# Patient Record
Sex: FEMALE | Race: WHITE | Employment: OTHER | URBAN - METROPOLITAN AREA
[De-identification: names, ages, dates, MRNs, and addresses within clinical notes are randomized per-mention and may not be internally consistent; named-entity substitution may affect disease eponyms.]

---

## 2018-07-30 ENCOUNTER — HOSPITAL ENCOUNTER (INPATIENT)
Age: 73
LOS: 2 days | Discharge: HOME | DRG: 444 | End: 2018-08-01
Attending: EMERGENCY MEDICINE | Admitting: INTERNAL MEDICINE
Payer: MEDICARE

## 2018-07-30 ENCOUNTER — APPOINTMENT (OUTPATIENT)
Dept: ULTRASOUND IMAGING | Age: 73
DRG: 444 | End: 2018-07-30
Payer: MEDICARE

## 2018-07-30 DIAGNOSIS — K80.00 CALCULUS OF GALLBLADDER WITH ACUTE CHOLECYSTITIS WITHOUT OBSTRUCTION: ICD-10-CM

## 2018-07-30 DIAGNOSIS — K85.90 ACUTE PANCREATITIS, UNSPECIFIED COMPLICATION STATUS, UNSPECIFIED PANCREATITIS TYPE: Primary | ICD-10-CM

## 2018-07-30 PROBLEM — K85.10 ACUTE GALLSTONE PANCREATITIS: Status: ACTIVE | Noted: 2018-07-30

## 2018-07-30 LAB
A/G RATIO: 1.4 (ref 1.1–2.2)
ALBUMIN SERPL-MCNC: 4.2 G/DL (ref 3.4–5)
ALP BLD-CCNC: 99 U/L (ref 40–129)
ALT SERPL-CCNC: 39 U/L (ref 10–40)
ANION GAP SERPL CALCULATED.3IONS-SCNC: 9 MMOL/L (ref 3–16)
AST SERPL-CCNC: 107 U/L (ref 15–37)
BASOPHILS ABSOLUTE: 0 K/UL (ref 0–0.2)
BASOPHILS RELATIVE PERCENT: 0.6 %
BILIRUB SERPL-MCNC: 0.8 MG/DL (ref 0–1)
BUN BLDV-MCNC: 17 MG/DL (ref 7–20)
CALCIUM SERPL-MCNC: 9.7 MG/DL (ref 8.3–10.6)
CHLORIDE BLD-SCNC: 102 MMOL/L (ref 99–110)
CO2: 26 MMOL/L (ref 21–32)
CREAT SERPL-MCNC: 0.8 MG/DL (ref 0.6–1.2)
EOSINOPHILS ABSOLUTE: 0.1 K/UL (ref 0–0.6)
EOSINOPHILS RELATIVE PERCENT: 1 %
GFR AFRICAN AMERICAN: >60
GFR NON-AFRICAN AMERICAN: >60
GLOBULIN: 2.9 G/DL
GLUCOSE BLD-MCNC: 125 MG/DL (ref 70–99)
HCT VFR BLD CALC: 41 % (ref 36–48)
HEMOGLOBIN: 13.7 G/DL (ref 12–16)
LIPASE: >600 U/L (ref 13–60)
LYMPHOCYTES ABSOLUTE: 1 K/UL (ref 1–5.1)
LYMPHOCYTES RELATIVE PERCENT: 12.6 %
MCH RBC QN AUTO: 30.3 PG (ref 26–34)
MCHC RBC AUTO-ENTMCNC: 33.5 G/DL (ref 31–36)
MCV RBC AUTO: 90.6 FL (ref 80–100)
MONOCYTES ABSOLUTE: 0.4 K/UL (ref 0–1.3)
MONOCYTES RELATIVE PERCENT: 5.5 %
NEUTROPHILS ABSOLUTE: 6.1 K/UL (ref 1.7–7.7)
NEUTROPHILS RELATIVE PERCENT: 80.3 %
PDW BLD-RTO: 14.7 % (ref 12.4–15.4)
PLATELET # BLD: 176 K/UL (ref 135–450)
PMV BLD AUTO: 8.1 FL (ref 5–10.5)
POTASSIUM SERPL-SCNC: 4.4 MMOL/L (ref 3.5–5.1)
RBC # BLD: 4.53 M/UL (ref 4–5.2)
SODIUM BLD-SCNC: 137 MMOL/L (ref 136–145)
TOTAL PROTEIN: 7.1 G/DL (ref 6.4–8.2)
WBC # BLD: 7.6 K/UL (ref 4–11)

## 2018-07-30 PROCEDURE — 6360000002 HC RX W HCPCS: Performed by: PHYSICIAN ASSISTANT

## 2018-07-30 PROCEDURE — S0028 INJECTION, FAMOTIDINE, 20 MG: HCPCS | Performed by: INTERNAL MEDICINE

## 2018-07-30 PROCEDURE — 83690 ASSAY OF LIPASE: CPT

## 2018-07-30 PROCEDURE — 96375 TX/PRO/DX INJ NEW DRUG ADDON: CPT

## 2018-07-30 PROCEDURE — 6360000002 HC RX W HCPCS: Performed by: INTERNAL MEDICINE

## 2018-07-30 PROCEDURE — 2500000003 HC RX 250 WO HCPCS: Performed by: INTERNAL MEDICINE

## 2018-07-30 PROCEDURE — 2580000003 HC RX 258: Performed by: PHYSICIAN ASSISTANT

## 2018-07-30 PROCEDURE — 76705 ECHO EXAM OF ABDOMEN: CPT

## 2018-07-30 PROCEDURE — 99285 EMERGENCY DEPT VISIT HI MDM: CPT

## 2018-07-30 PROCEDURE — 2580000003 HC RX 258: Performed by: INTERNAL MEDICINE

## 2018-07-30 PROCEDURE — 96374 THER/PROPH/DIAG INJ IV PUSH: CPT

## 2018-07-30 PROCEDURE — 96361 HYDRATE IV INFUSION ADD-ON: CPT

## 2018-07-30 PROCEDURE — 80053 COMPREHEN METABOLIC PANEL: CPT

## 2018-07-30 PROCEDURE — 85025 COMPLETE CBC W/AUTO DIFF WBC: CPT

## 2018-07-30 PROCEDURE — 1200000000 HC SEMI PRIVATE

## 2018-07-30 RX ORDER — MAGNESIUM SULFATE 1 G/100ML
1 INJECTION INTRAVENOUS PRN
Status: DISCONTINUED | OUTPATIENT
Start: 2018-07-30 | End: 2018-08-01 | Stop reason: HOSPADM

## 2018-07-30 RX ORDER — KETOROLAC TROMETHAMINE 30 MG/ML
15 INJECTION, SOLUTION INTRAMUSCULAR; INTRAVENOUS ONCE
Status: COMPLETED | OUTPATIENT
Start: 2018-07-30 | End: 2018-07-30

## 2018-07-30 RX ORDER — ONDANSETRON 2 MG/ML
4 INJECTION INTRAMUSCULAR; INTRAVENOUS EVERY 6 HOURS PRN
Status: DISCONTINUED | OUTPATIENT
Start: 2018-07-30 | End: 2018-07-30 | Stop reason: CLARIF

## 2018-07-30 RX ORDER — SODIUM CHLORIDE 0.9 % (FLUSH) 0.9 %
10 SYRINGE (ML) INJECTION PRN
Status: DISCONTINUED | OUTPATIENT
Start: 2018-07-30 | End: 2018-08-01 | Stop reason: HOSPADM

## 2018-07-30 RX ORDER — POTASSIUM CHLORIDE 7.45 MG/ML
10 INJECTION INTRAVENOUS PRN
Status: DISCONTINUED | OUTPATIENT
Start: 2018-07-30 | End: 2018-08-01 | Stop reason: HOSPADM

## 2018-07-30 RX ORDER — PRAVASTATIN SODIUM 10 MG
10 TABLET ORAL DAILY
COMMUNITY

## 2018-07-30 RX ORDER — MORPHINE SULFATE 1 MG/ML
2 INJECTION, SOLUTION EPIDURAL; INTRATHECAL; INTRAVENOUS
Status: DISCONTINUED | OUTPATIENT
Start: 2018-07-30 | End: 2018-08-01 | Stop reason: HOSPADM

## 2018-07-30 RX ORDER — SODIUM CHLORIDE 0.9 % (FLUSH) 0.9 %
10 SYRINGE (ML) INJECTION EVERY 12 HOURS SCHEDULED
Status: DISCONTINUED | OUTPATIENT
Start: 2018-07-30 | End: 2018-08-01 | Stop reason: HOSPADM

## 2018-07-30 RX ORDER — SODIUM CHLORIDE, SODIUM LACTATE, POTASSIUM CHLORIDE, CALCIUM CHLORIDE 600; 310; 30; 20 MG/100ML; MG/100ML; MG/100ML; MG/100ML
INJECTION, SOLUTION INTRAVENOUS CONTINUOUS
Status: DISCONTINUED | OUTPATIENT
Start: 2018-07-30 | End: 2018-08-01 | Stop reason: HOSPADM

## 2018-07-30 RX ORDER — 0.9 % SODIUM CHLORIDE 0.9 %
1000 INTRAVENOUS SOLUTION INTRAVENOUS ONCE
Status: COMPLETED | OUTPATIENT
Start: 2018-07-30 | End: 2018-07-30

## 2018-07-30 RX ADMIN — KETOROLAC TROMETHAMINE 15 MG: 30 INJECTION, SOLUTION INTRAMUSCULAR at 19:27

## 2018-07-30 RX ADMIN — SODIUM CHLORIDE, POTASSIUM CHLORIDE, SODIUM LACTATE AND CALCIUM CHLORIDE: 600; 310; 30; 20 INJECTION, SOLUTION INTRAVENOUS at 22:53

## 2018-07-30 RX ADMIN — SODIUM CHLORIDE 1000 ML: 9 INJECTION, SOLUTION INTRAVENOUS at 19:27

## 2018-07-30 RX ADMIN — Medication 2 MG: at 23:16

## 2018-07-30 RX ADMIN — Medication 10 ML: at 22:53

## 2018-07-30 RX ADMIN — FAMOTIDINE 20 MG: 10 INJECTION, SOLUTION INTRAVENOUS at 22:53

## 2018-07-30 RX ADMIN — PROCHLORPERAZINE EDISYLATE 10 MG: 5 INJECTION INTRAMUSCULAR; INTRAVENOUS at 19:27

## 2018-07-30 ASSESSMENT — PAIN DESCRIPTION - PAIN TYPE: TYPE: ACUTE PAIN

## 2018-07-30 ASSESSMENT — PAIN SCALES - GENERAL
PAINLEVEL_OUTOF10: 8

## 2018-07-30 ASSESSMENT — PAIN DESCRIPTION - LOCATION: LOCATION: ABDOMEN

## 2018-07-30 NOTE — ED PROVIDER NOTES
[address] ED  eMERGENCY dEPARTMENT eNCOUnter        Pt Name: Letty Austin  MRN: [id number]  [place] 1945  Date of evaluation: 7/30/2018  Provider: Jessica Paz PA-C  PCP: No primary care provider on file. ED Attending: Rufus Drummond MD    CHIEF COMPLAINT       Chief Complaint   Patient presents with    Abdominal Pain     pt c/o right sided abdominal pain that started a couple hours ago. HISTORY OF PRESENT ILLNESS   (Location/Symptom, Timing/Onset, Context/Setting, Quality, Duration, Modifying Factors, Severity)  Note limiting factors. Letty Austin is a 67 y.o. female presents to the emergency department with abdominal pain. The patient states that she will set down to eat dinner, had a few bites and started having abdominal pain that radiated to her back. She describes her pain as an 8 out of 10 sharp pain. She states that she was sweaty initially, but denies any chest pain or shortness of breath. Denies fever, chills, nausea, vomiting, diarrhea, hematochezia, urinary symptoms. Denies any previous abdominal surgery. Nursing Notes were all reviewed and agreed with or any disagreements were addressed  in the HPI. REVIEW OF SYSTEMS    (2-9 systems for level 4, 10 or more for level 5)     Review of Systems    Positives and Pertinent negatives as per HPI. Except as noted above in the ROS, all other systems were reviewed and negative. PAST MEDICAL HISTORY     Past Medical History:   Diagnosis Date    Hyperlipidemia          SURGICAL HISTORY       Past Surgical History:   Procedure Laterality Date    LYMPHADENECTOMY           CURRENT MEDICATIONS       Previous Medications    PRAVASTATIN (PRAVACHOL) 10 MG TABLET    Take 10 mg by mouth daily         ALLERGIES     Percocet [oxycodone-acetaminophen]    FAMILY HISTORY     History reviewed. No pertinent family history.        SOCIAL HISTORY       Social History     Social History    Marital status:

## 2018-07-31 LAB
ALBUMIN SERPL-MCNC: 3.5 G/DL (ref 3.4–5)
ALP BLD-CCNC: 108 U/L (ref 40–129)
ALT SERPL-CCNC: 177 U/L (ref 10–40)
ANION GAP SERPL CALCULATED.3IONS-SCNC: 11 MMOL/L (ref 3–16)
AST SERPL-CCNC: 257 U/L (ref 15–37)
BACTERIA: ABNORMAL /HPF
BILIRUB SERPL-MCNC: 0.8 MG/DL (ref 0–1)
BILIRUBIN DIRECT: 0.3 MG/DL (ref 0–0.3)
BILIRUBIN URINE: NEGATIVE
BILIRUBIN, INDIRECT: 0.5 MG/DL (ref 0–1)
BLOOD, URINE: NEGATIVE
BUN BLDV-MCNC: 13 MG/DL (ref 7–20)
CALCIUM SERPL-MCNC: 8.9 MG/DL (ref 8.3–10.6)
CHLORIDE BLD-SCNC: 105 MMOL/L (ref 99–110)
CLARITY: CLEAR
CO2: 24 MMOL/L (ref 21–32)
COLOR: YELLOW
CREAT SERPL-MCNC: 0.6 MG/DL (ref 0.6–1.2)
EPITHELIAL CELLS, UA: ABNORMAL /HPF
GFR AFRICAN AMERICAN: >60
GFR NON-AFRICAN AMERICAN: >60
GLUCOSE BLD-MCNC: 103 MG/DL (ref 70–99)
GLUCOSE URINE: NEGATIVE MG/DL
KETONES, URINE: NEGATIVE MG/DL
LEUKOCYTE ESTERASE, URINE: ABNORMAL
LIPASE: >600 U/L (ref 13–60)
MICROSCOPIC EXAMINATION: YES
MUCUS: ABNORMAL /LPF
NITRITE, URINE: NEGATIVE
PH UA: 6
POTASSIUM REFLEX MAGNESIUM: 4 MMOL/L (ref 3.5–5.1)
PROTEIN UA: NEGATIVE MG/DL
RBC UA: ABNORMAL /HPF (ref 0–2)
SODIUM BLD-SCNC: 140 MMOL/L (ref 136–145)
SPECIFIC GRAVITY UA: 1.01
TOTAL PROTEIN: 5.8 G/DL (ref 6.4–8.2)
URINE REFLEX TO CULTURE: YES
URINE TYPE: ABNORMAL
UROBILINOGEN, URINE: 0.2 E.U./DL
WBC UA: ABNORMAL /HPF (ref 0–5)

## 2018-07-31 PROCEDURE — S0028 INJECTION, FAMOTIDINE, 20 MG: HCPCS | Performed by: INTERNAL MEDICINE

## 2018-07-31 PROCEDURE — 87186 SC STD MICRODIL/AGAR DIL: CPT

## 2018-07-31 PROCEDURE — 6370000000 HC RX 637 (ALT 250 FOR IP): Performed by: INTERNAL MEDICINE

## 2018-07-31 PROCEDURE — 99222 1ST HOSP IP/OBS MODERATE 55: CPT | Performed by: SURGERY

## 2018-07-31 PROCEDURE — 1200000000 HC SEMI PRIVATE

## 2018-07-31 PROCEDURE — 87077 CULTURE AEROBIC IDENTIFY: CPT

## 2018-07-31 PROCEDURE — 6360000002 HC RX W HCPCS: Performed by: INTERNAL MEDICINE

## 2018-07-31 PROCEDURE — 2500000003 HC RX 250 WO HCPCS: Performed by: INTERNAL MEDICINE

## 2018-07-31 PROCEDURE — 36415 COLL VENOUS BLD VENIPUNCTURE: CPT

## 2018-07-31 PROCEDURE — 80048 BASIC METABOLIC PNL TOTAL CA: CPT

## 2018-07-31 PROCEDURE — 80076 HEPATIC FUNCTION PANEL: CPT

## 2018-07-31 PROCEDURE — 2580000003 HC RX 258: Performed by: INTERNAL MEDICINE

## 2018-07-31 PROCEDURE — 83690 ASSAY OF LIPASE: CPT

## 2018-07-31 PROCEDURE — 87086 URINE CULTURE/COLONY COUNT: CPT

## 2018-07-31 PROCEDURE — 81001 URINALYSIS AUTO W/SCOPE: CPT

## 2018-07-31 PROCEDURE — 99232 SBSQ HOSP IP/OBS MODERATE 35: CPT | Performed by: INTERNAL MEDICINE

## 2018-07-31 RX ORDER — ACETAMINOPHEN 325 MG/1
650 TABLET ORAL EVERY 4 HOURS PRN
Status: DISCONTINUED | OUTPATIENT
Start: 2018-07-31 | End: 2018-08-01 | Stop reason: HOSPADM

## 2018-07-31 RX ADMIN — Medication 2 MG: at 02:54

## 2018-07-31 RX ADMIN — FAMOTIDINE 20 MG: 10 INJECTION, SOLUTION INTRAVENOUS at 08:44

## 2018-07-31 RX ADMIN — SODIUM CHLORIDE, POTASSIUM CHLORIDE, SODIUM LACTATE AND CALCIUM CHLORIDE: 600; 310; 30; 20 INJECTION, SOLUTION INTRAVENOUS at 07:26

## 2018-07-31 RX ADMIN — ACETAMINOPHEN 650 MG: 325 TABLET ORAL at 16:29

## 2018-07-31 RX ADMIN — SODIUM CHLORIDE, POTASSIUM CHLORIDE, SODIUM LACTATE AND CALCIUM CHLORIDE: 600; 310; 30; 20 INJECTION, SOLUTION INTRAVENOUS at 22:09

## 2018-07-31 RX ADMIN — ENOXAPARIN SODIUM 40 MG: 100 INJECTION SUBCUTANEOUS at 10:37

## 2018-07-31 RX ADMIN — Medication 10 ML: at 08:44

## 2018-07-31 RX ADMIN — Medication 10 ML: at 21:06

## 2018-07-31 RX ADMIN — FAMOTIDINE 20 MG: 10 INJECTION, SOLUTION INTRAVENOUS at 21:06

## 2018-07-31 RX ADMIN — SODIUM CHLORIDE, POTASSIUM CHLORIDE, SODIUM LACTATE AND CALCIUM CHLORIDE: 600; 310; 30; 20 INJECTION, SOLUTION INTRAVENOUS at 15:11

## 2018-07-31 ASSESSMENT — PAIN SCALES - GENERAL
PAINLEVEL_OUTOF10: 8
PAINLEVEL_OUTOF10: 2
PAINLEVEL_OUTOF10: 4

## 2018-07-31 NOTE — PROGRESS NOTES
Morning assessment complete and medications given. Patient states she has a pacemaker and was told she could not have a MRI. Spoke with MRI staff who also stated patient could not have an MRI with pacemaker, unless MRI safe pacemaker. Per patient she was instructed not to have an MRI. Patient states pain is a 4/10, and tolerable. Patient states she is steady on her feet and will use call light for needs. Patient denies any additional needs at this time. Call light within reach. Will continue to monitor.   Patty Ballard

## 2018-07-31 NOTE — ED NOTES
Perfect serve sent to Dr Caroline Gaitan  07/30/18 2113  Dr Momo Morgan returned the call, Shan Slade spoke with him and he wants us to consult surgery. Call placed to Dr Regino Mary answering service. Loisverly Vick  07/30/18 2128  Dr Rhea Mayer returned call.      Loisverly Vick  07/30/18 2133

## 2018-07-31 NOTE — PROGRESS NOTES
Patient has an oral temperature of 100.9, extra blankets removed. Perfect serve sent to Dr. Mandeep Christianson at this time, in regard to medication for fever.   Patty Ballard

## 2018-07-31 NOTE — H&P
[address]                  [address] [place] [address]                               HISTORY AND PHYSICAL    PATIENT NAME: Jerome Garcia                     :        1945  MED REC NO:   [id number]                          ROOM:       1293  ACCOUNT NO:   [de-identified]                           ADMIT DATE: 2018  PROVIDER:     Hunter Leventhal, MD    Date of service 2018. CHIEF COMPLAINT:  Abdominal pain. HISTORY OF PRESENT ILLNESS:  The patient is a 76-year-old  female,  presents to the hospital with chief complaint of 1 day history of what she  describes as sudden onset of rapidly progressive, gradually increasing  severe epigastric, cramping abdominal pain that started when she was  actually trying to fix some dinner for her . The patient states  that she tried to have a bite of food or two but that just greatly  aggravated the belly pain, because of which she came to the emergency room. At the time of my exam, the patient states that her pain is coming back in  waves. PAST MEDICAL/PAST SURGICAL HISTORY:  None. ALLERGIC HISTORY:  PERCOCET. FAMILY HISTORY:  Reviewed by me and is currently noncontributory. SOCIAL HISTORY:  Lives at home, nonsmoker. No illicit substance use. MEDICATIONS:  Home medication list has been reviewed. REVIEW OF SYSTEMS:  Significant for the abdominal pain and per the history  of present illness. All other systems have been reviewed and are negative  except for the history of present illness. PHYSICAL EXAMINATION:  The patient was examined by me on the medical floor. VITAL SIGNS:  Temperature 97.5, respiratory rate is 16, pulse 72, blood  pressure is 139/110 to 152/73, saturating 100%. CNS:  Alert, awake, and oriented to time, place and person. PSYCH:  The patient is pleasant, answering questions appropriately. EYES:  Pupils are reactive to light.   ENT:  Extraocular will start the patient on IV antibiotics, if the patient  shows any systemic signs or symptoms of infection. DISPOSITION:  Admitted to the Internal Medicine service.         Annmarie Kelly MD    D: 07/30/2018 23:58:29       T: 07/31/2018 0:44:32     [email or web address]  Job#: [id number]     Doc#: [id number]    CC:

## 2018-07-31 NOTE — ED PROVIDER NOTES
I independently examined and evaluated Kenya Altamirano. In brief, patient presenting for evaluation of abdominal pain which she localizes to the epigastric and right upper quadrant. She did not really eat anything for dinner because of the pain. .    Focused exam revealed patient is generally well-appearing and in no acute distress. Of note, I did evaluate the patient after she received pain medications. At that time, she had minimal discomfort with palpation of the epigastric and right upper quadrant with negative Mei sign. .    Imaging:  Us Gallbladder Ruq    Result Date: 7/30/2018  EXAMINATION: RIGHT UPPER QUADRANT ULTRASOUND 7/30/2018 7:32 pm COMPARISON: None. HISTORY: ORDERING SYSTEM PROVIDED HISTORY: RUQ abdominal pain TECHNOLOGIST PROVIDED HISTORY: Ordering Physician Provided Reason for Exam: RUQ abdominal pain Type of Exam: Unknown FINDINGS: LIVER:  The liver demonstrates normal echogenicity without evidence of intrahepatic biliary ductal dilatation. BILIARY SYSTEM:  There are several shadowing echogenic gallstones within the gallbladder along with a moderate amount of echogenic sludge. There is abnormal wall thickening of 5 mm. Common bile duct is within normal limits measuring 6 mm. RIGHT KIDNEY: The right kidney is grossly unremarkable without evidence of hydronephrosis. PANCREAS:  Visualized portions of the pancreas are unremarkable. OTHER: No evidence of right upper quadrant ascites. Cholelithiasis with equivocal cholecystitis. ED course: Patient presenting for evaluation of right upper quadrant epigastric abdominal discomfort. She has noted significant elevation of her lipase greater than 600 and I'm concern for possible gallstone pancreatitis given the cholelithiasis noted on right upper quadrant ultrasound. Clinical thickening of the gallbladder wall but no evidence of fever or leukocytosis. We will give a dose of Zosyn for coverage of possible infection though.   At this time he feel that inpatient admission for further evaluation and management is most appropriate. Patient be admitted to the hospitalist at this time. All diagnostic, treatment, and disposition decisions were made by myself in conjunction with the advanced practice provider. For all further details of the patient's emergency department visit, please see the advanced practice provider's documentation. Comment: Please note this report has been produced using speech recognition software and may contain errors related to that system including errors in grammar, punctuation, and spelling, as well as words and phrases that may be inappropriate. If there are any questions or concerns please feel free to contact the dictating provider for clarification.        Niki Ramos MD  07/30/18 7972

## 2018-08-01 VITALS
TEMPERATURE: 96.9 F | BODY MASS INDEX: 24.67 KG/M2 | OXYGEN SATURATION: 97 % | HEIGHT: 65 IN | SYSTOLIC BLOOD PRESSURE: 136 MMHG | WEIGHT: 148.06 LBS | HEART RATE: 90 BPM | RESPIRATION RATE: 14 BRPM | DIASTOLIC BLOOD PRESSURE: 61 MMHG

## 2018-08-01 LAB
ALBUMIN SERPL-MCNC: 3.4 G/DL (ref 3.4–5)
ALP BLD-CCNC: 105 U/L (ref 40–129)
ALT SERPL-CCNC: 96 U/L (ref 10–40)
ANION GAP SERPL CALCULATED.3IONS-SCNC: 11 MMOL/L (ref 3–16)
AST SERPL-CCNC: 64 U/L (ref 15–37)
BASOPHILS ABSOLUTE: 0 K/UL (ref 0–0.2)
BASOPHILS RELATIVE PERCENT: 0 %
BILIRUB SERPL-MCNC: 1.3 MG/DL (ref 0–1)
BILIRUBIN DIRECT: 0.4 MG/DL (ref 0–0.3)
BILIRUBIN, INDIRECT: 0.9 MG/DL (ref 0–1)
BUN BLDV-MCNC: 9 MG/DL (ref 7–20)
CALCIUM SERPL-MCNC: 8.8 MG/DL (ref 8.3–10.6)
CHLORIDE BLD-SCNC: 101 MMOL/L (ref 99–110)
CO2: 21 MMOL/L (ref 21–32)
CREAT SERPL-MCNC: 0.6 MG/DL (ref 0.6–1.2)
EOSINOPHILS ABSOLUTE: 0 K/UL (ref 0–0.6)
EOSINOPHILS RELATIVE PERCENT: 0 %
GFR AFRICAN AMERICAN: >60
GFR NON-AFRICAN AMERICAN: >60
GLUCOSE BLD-MCNC: 74 MG/DL (ref 70–99)
HCT VFR BLD CALC: 36.7 % (ref 36–48)
HEMOGLOBIN: 12.4 G/DL (ref 12–16)
LIPASE: 393 U/L (ref 13–60)
LYMPHOCYTES ABSOLUTE: 1.4 K/UL (ref 1–5.1)
LYMPHOCYTES RELATIVE PERCENT: 16 %
MAGNESIUM: 1.9 MG/DL (ref 1.8–2.4)
MCH RBC QN AUTO: 30.7 PG (ref 26–34)
MCHC RBC AUTO-ENTMCNC: 33.9 G/DL (ref 31–36)
MCV RBC AUTO: 90.6 FL (ref 80–100)
MONOCYTES ABSOLUTE: 0.3 K/UL (ref 0–1.3)
MONOCYTES RELATIVE PERCENT: 3 %
NEUTROPHILS ABSOLUTE: 7 K/UL (ref 1.7–7.7)
NEUTROPHILS RELATIVE PERCENT: 81 %
PDW BLD-RTO: 14.2 % (ref 12.4–15.4)
PHOSPHORUS: 2.5 MG/DL (ref 2.5–4.9)
PLATELET # BLD: 126 K/UL (ref 135–450)
PLATELET SLIDE REVIEW: ADEQUATE
PMV BLD AUTO: 9.5 FL (ref 5–10.5)
POTASSIUM SERPL-SCNC: 4.2 MMOL/L (ref 3.5–5.1)
RBC # BLD: 4.05 M/UL (ref 4–5.2)
SLIDE REVIEW: ABNORMAL
SODIUM BLD-SCNC: 133 MMOL/L (ref 136–145)
TOTAL PROTEIN: 6.2 G/DL (ref 6.4–8.2)
WBC # BLD: 8.7 K/UL (ref 4–11)

## 2018-08-01 PROCEDURE — 2500000003 HC RX 250 WO HCPCS: Performed by: INTERNAL MEDICINE

## 2018-08-01 PROCEDURE — 2580000003 HC RX 258: Performed by: INTERNAL MEDICINE

## 2018-08-01 PROCEDURE — 83735 ASSAY OF MAGNESIUM: CPT

## 2018-08-01 PROCEDURE — 6360000002 HC RX W HCPCS: Performed by: INTERNAL MEDICINE

## 2018-08-01 PROCEDURE — 80048 BASIC METABOLIC PNL TOTAL CA: CPT

## 2018-08-01 PROCEDURE — 85025 COMPLETE CBC W/AUTO DIFF WBC: CPT

## 2018-08-01 PROCEDURE — 83690 ASSAY OF LIPASE: CPT

## 2018-08-01 PROCEDURE — 36415 COLL VENOUS BLD VENIPUNCTURE: CPT

## 2018-08-01 PROCEDURE — 80076 HEPATIC FUNCTION PANEL: CPT

## 2018-08-01 PROCEDURE — 99238 HOSP IP/OBS DSCHRG MGMT 30/<: CPT | Performed by: INTERNAL MEDICINE

## 2018-08-01 PROCEDURE — 99232 SBSQ HOSP IP/OBS MODERATE 35: CPT | Performed by: SURGERY

## 2018-08-01 PROCEDURE — S0028 INJECTION, FAMOTIDINE, 20 MG: HCPCS | Performed by: INTERNAL MEDICINE

## 2018-08-01 PROCEDURE — 2580000003 HC RX 258

## 2018-08-01 PROCEDURE — 84100 ASSAY OF PHOSPHORUS: CPT

## 2018-08-01 RX ORDER — SODIUM CHLORIDE 9 MG/ML
INJECTION, SOLUTION INTRAVENOUS
Status: COMPLETED
Start: 2018-08-01 | End: 2018-08-01

## 2018-08-01 RX ORDER — LEVOFLOXACIN 500 MG/1
500 TABLET, FILM COATED ORAL DAILY
Qty: 5 TABLET | Refills: 0 | Status: SHIPPED | OUTPATIENT
Start: 2018-08-01 | End: 2018-08-06

## 2018-08-01 RX ADMIN — ENOXAPARIN SODIUM 40 MG: 100 INJECTION SUBCUTANEOUS at 08:40

## 2018-08-01 RX ADMIN — Medication 10 ML: at 09:01

## 2018-08-01 RX ADMIN — PIPERACILLIN SODIUM AND TAZOBACTAM SODIUM 3.38 G: 3; .375 INJECTION, POWDER, LYOPHILIZED, FOR SOLUTION INTRAVENOUS at 08:40

## 2018-08-01 RX ADMIN — FAMOTIDINE 20 MG: 10 INJECTION, SOLUTION INTRAVENOUS at 08:41

## 2018-08-01 RX ADMIN — SODIUM CHLORIDE, POTASSIUM CHLORIDE, SODIUM LACTATE AND CALCIUM CHLORIDE: 600; 310; 30; 20 INJECTION, SOLUTION INTRAVENOUS at 05:34

## 2018-08-01 RX ADMIN — SODIUM CHLORIDE: 9 INJECTION, SOLUTION INTRAVENOUS at 09:01

## 2018-08-01 ASSESSMENT — PAIN SCALES - GENERAL
PAINLEVEL_OUTOF10: 0
PAINLEVEL_OUTOF10: 0

## 2018-08-01 NOTE — PROGRESS NOTES
General Surgery [facility], [facility]  Daily Progress Note    Pt Name: Simon Ferreira  Medical Record Number: [id number]  Date of Birth 1945   Today's Date: 2018    ASSESSMENT  1. RUQ abd ultrasound: stones  2. ABD: soft, non-tender, no N/V, + flatus, + BM this am, no distnetion  3. Acute pancreatitis resolving: Lipase 393  4. AST/ALT improvin/96  5. Leuks normal  6. + UTI     PLAN  1. Start liquid diet  2. If pt able to tolerate liquids then, may be discharged home from a surgical standpoint on a full liquid diet (advance as tolerated to a low fat diet) with plans to follow-up with Dr. Dmitriy Hills in office in 1-2 weeks. Discussed with Dr. Selina Avilesr has slightly improved from yesterday. Pain is well controlled. She has no nausea and no vomiting. She has passed flatus and has had a bowel movement. She is tolerating clear liquids. Current activity is up with assistance    OBJECTIVE  VITALS:  height is 5' 5\" (1.651 m) and weight is 148 lb 1 oz (67.2 kg). Her oral temperature is 96.9 °F (36.1 °C). Her blood pressure is 136/61 and her pulse is 90. Her respiration is 14 and oxygen saturation is 97%. VITALS:  /61   Pulse 90   Temp 96.9 °F (36.1 °C) (Oral)   Resp 14   Ht 5' 5\" (1.651 m)   Wt 148 lb 1 oz (67.2 kg)   SpO2 97%   BMI 24.64 kg/m²   INTAKE/OUTPUT:    Intake/Output Summary (Last 24 hours) at 18 1335  Last data filed at 18 1301   Gross per 24 hour   Intake             3163 ml   Output                0 ml   Net             3163 ml     GENERAL: alert, cooperative, no distress    I/O last 3 completed shifts: In: 3441 [I.V.:3784]  Out: -   I/O this shift:   In: 580 [P.O.:480; IV Piggyback:100]  Out: -     LABS  Recent Labs      18   1401  18   0759   WBC   --   8.7   HGB   --   12.4   HCT   --   36.7   PLT   --   126*   NA   --   133*   K   --   4.2   CL   --   101   CO2   --   21   BUN   --   9   CREATININE   --   0.6   MG   --   1.90   PHOS --   2.5   CALCIUM   --   8.8   AST   --   64*   ALT   --   96*   BILITOT   --   1.3*   BILIDIR   --   0.4*   NITRU  Negative   --    COLORU  Yellow   --    BACTERIA  2+*   --      CBC with Differential:    Lab Results   Component Value Date    WBC 8.7 08/01/2018    RBC 4.05 08/01/2018    HGB 12.4 08/01/2018    HCT 36.7 08/01/2018     08/01/2018    MCV 90.6 08/01/2018    MCH 30.7 08/01/2018    MCHC 33.9 08/01/2018    RDW 14.2 08/01/2018    LYMPHOPCT 16.0 08/01/2018    MONOPCT 3.0 08/01/2018    BASOPCT 0.0 08/01/2018    MONOSABS 0.3 08/01/2018    LYMPHSABS 1.4 08/01/2018    EOSABS 0.0 08/01/2018    BASOSABS 0.0 08/01/2018     CMP:    Lab Results   Component Value Date     08/01/2018    K 4.2 08/01/2018    K 4.0 07/31/2018     08/01/2018    CO2 21 08/01/2018    BUN 9 08/01/2018    CREATININE 0.6 08/01/2018    GFRAA >60 08/01/2018    AGRATIO 1.4 07/30/2018    LABGLOM >60 08/01/2018    GLUCOSE 74 08/01/2018    PROT 6.2 08/01/2018    LABALBU 3.4 08/01/2018    CALCIUM 8.8 08/01/2018    BILITOT 1.3 08/01/2018    ALKPHOS 105 08/01/2018    AST 64 08/01/2018    ALT 96 08/01/2018         Jessica Smallwood [employer]  Electronically signed 8/1/2018 at 1:31 PM     Patient without complaints. Abdomen benign. Resolving gallstone pancreatitis. OK for discharge when tolerating PO. Outpatient cholecystectomy will be scheduled.     [address] [facility]

## 2018-08-01 NOTE — PROGRESS NOTES
Resting in bed awake. No complaints or needs at present time. Am assessment complete. Alert and oriented. Call light in reach.

## 2018-08-01 NOTE — PROGRESS NOTES
gallbladder    Cholelithiasis with equivocal cholecystitis. ECHO 4/17      - Left ventricle: The cavity size was normal. Wall thickness was    normal. The calculated ejection fraction was 58%. Wall motion was    normal; there were no regional wall motion abnormalities. Doppler    parameters are consistent with abnormal left ventricular    relaxation (grade 1 diastolic dysfunction). Ejection fraction    (EF) was calculated using 2D biplane Bonilla&apos;s method. - Ventricular septum: Septal motion showed paradoxical septal    motion consistent with a conduction abnormality or paced rhythm. - Aortic valve: There was mild to moderate regurgitation.  - Mitral valve: The annulus was mildly calcified. The leaflets were    mildly thickened and mildly calcified. There was mild    regurgitation.  - Left atrium: The atrium was mildly dilated. - Right ventricle: Pacer wire noted in right ventricle. - Inferior vena cava: The vessel was normal in size; the    respirophasic diameter changes were in the normal range (>= 50%);    findings are consistent with normal central venous pressure. - Pulmonary arteries: PA peak pressure: 31mm Hg (S). Assessment & Plan:      Cholelithiasis with acute pancreatitis -  - no fevers, - Elevated LFT but normal biliriubin, improving now unable to do MRCP with pacer ( not needed at this time )   - fevers could be from pancreatitis. Resolved quickly    - surgery consutled  - pain improved and resolved today.  Has not taken pain meds   - can resume diet  - interval lori as outpt      Abdominal pain - sec to above- resolved    Complete Heart block - s.p pacer 2013, f/w Dr. Prasad Zabala at [facility]      dvt prophylaxis        Jatinder Galindo MD 8/1/2018 8:06 AM

## 2018-08-01 NOTE — CARE COORDINATION
[facility]  Received referral regarding possible HC services from [address] [place] [address]. Liaison to follow up with pt prior to discharge. Pt's  recently active with [facility] and discharged 5/2018.    4:18 Received call from [facility]. Pt is declining need for HC at this time. Liaison will no longer follow pt for [facility]. needs.     Electronically signed by Vitaliy Santos RN on 8/1/2018 at 12:25 PM

## 2018-08-02 ENCOUNTER — TELEPHONE (OUTPATIENT)
Dept: SURGERY | Age: 73
End: 2018-08-02

## 2018-08-02 LAB
ORGANISM: ABNORMAL
ORGANISM: ABNORMAL
URINE CULTURE, ROUTINE: ABNORMAL

## 2018-08-03 ENCOUNTER — ANESTHESIA EVENT (OUTPATIENT)
Dept: OPERATING ROOM | Age: 73
End: 2018-08-03
Payer: MEDICARE

## 2018-08-03 NOTE — DISCHARGE SUMMARY
(Please Review Full Report for Details)       Us gallbladder     Cholelithiasis with equivocal cholecystitis.          ECHO 4/17       - Left ventricle: The cavity size was normal. Wall thickness was    normal. The calculated ejection fraction was 58%. Wall motion was    normal; there were no regional wall motion abnormalities. Doppler    parameters are consistent with abnormal left ventricular    relaxation (grade 1 diastolic dysfunction). Ejection fraction    (EF) was calculated using 2D biplane Bonilla&apos;s method. - Ventricular septum: Septal motion showed paradoxical septal    motion consistent with a conduction abnormality or paced rhythm. - Aortic valve: There was mild to moderate regurgitation.  - Mitral valve: The annulus was mildly calcified. The leaflets were    mildly thickened and mildly calcified. There was mild    regurgitation.  - Left atrium: The atrium was mildly dilated. - Right ventricle: Pacer wire noted in right ventricle. - Inferior vena cava: The vessel was normal in size; the    respirophasic diameter changes were in the normal range (>= 50%);    findings are consistent with normal central venous pressure. - Pulmonary arteries: PA peak pressure: 31mm Hg (S). Urine    ENTEROCOCCUS FAECALIS     Antibiotic Interpretation IRIS Unit   ampicillin Sensitive <=2 mcg/mL   ciprofloxacin Sensitive <=1 mcg/mL   levofloxacin Sensitive <=1 mcg/mL   nitrofurantoin Sensitive <=32 mcg/mL   tetracycline Sensitive <=4 mcg/mL   vancomycin Sensitive 2 mcg/mL         ESCHERICHIA COLI     Antibiotic Interpretation IRIS Unit   amoxicillin-clavulanate Sensitive <=8/4 mcg/mL   ampicillin Resistant >16 mcg/mL   ceFAZolin Sensitive <=2 mcg/mL   Comment: NOTE: Cefazolin should only be used for uncomplicated UTI        for E.coli, Klebsiella pneumoniae or Proteus mirabilis.    cefOXitin Sensitive <=8 mcg/mL   cefTRIAXone Sensitive <=1 mcg/mL   cefepime Sensitive <=2 mcg/mL   cefotaxime Sensitive <=2 mcg/mL

## 2018-08-06 ENCOUNTER — APPOINTMENT (OUTPATIENT)
Dept: GENERAL RADIOLOGY | Age: 73
End: 2018-08-06
Attending: SURGERY
Payer: MEDICARE

## 2018-08-06 ENCOUNTER — ANESTHESIA (OUTPATIENT)
Dept: OPERATING ROOM | Age: 73
End: 2018-08-06
Payer: MEDICARE

## 2018-08-06 ENCOUNTER — HOSPITAL ENCOUNTER (OUTPATIENT)
Age: 73
Setting detail: OUTPATIENT SURGERY
Discharge: HOME | End: 2018-08-06
Attending: SURGERY | Admitting: SURGERY
Payer: MEDICARE

## 2018-08-06 VITALS
DIASTOLIC BLOOD PRESSURE: 59 MMHG | WEIGHT: 145 LBS | OXYGEN SATURATION: 95 % | RESPIRATION RATE: 14 BRPM | HEART RATE: 74 BPM | BODY MASS INDEX: 24.16 KG/M2 | TEMPERATURE: 97.9 F | HEIGHT: 65 IN | SYSTOLIC BLOOD PRESSURE: 140 MMHG

## 2018-08-06 VITALS
SYSTOLIC BLOOD PRESSURE: 146 MMHG | DIASTOLIC BLOOD PRESSURE: 60 MMHG | RESPIRATION RATE: 13 BRPM | OXYGEN SATURATION: 99 %

## 2018-08-06 DIAGNOSIS — K85.10 GALLSTONE PANCREATITIS: Primary | ICD-10-CM

## 2018-08-06 LAB
ALBUMIN SERPL-MCNC: 3.6 G/DL (ref 3.4–5)
ALP BLD-CCNC: 83 U/L (ref 40–129)
ALT SERPL-CCNC: 25 U/L (ref 10–40)
AST SERPL-CCNC: 13 U/L (ref 15–37)
BILIRUB SERPL-MCNC: 0.3 MG/DL (ref 0–1)
BILIRUBIN DIRECT: <0.2 MG/DL (ref 0–0.3)
BILIRUBIN, INDIRECT: ABNORMAL MG/DL (ref 0–1)
TOTAL PROTEIN: 6.2 G/DL (ref 6.4–8.2)

## 2018-08-06 PROCEDURE — 3600000004 HC SURGERY LEVEL 4 BASE: Performed by: SURGERY

## 2018-08-06 PROCEDURE — 6360000002 HC RX W HCPCS: Performed by: ANESTHESIOLOGY

## 2018-08-06 PROCEDURE — 6360000002 HC RX W HCPCS: Performed by: SURGERY

## 2018-08-06 PROCEDURE — 88304 TISSUE EXAM BY PATHOLOGIST: CPT

## 2018-08-06 PROCEDURE — 2500000003 HC RX 250 WO HCPCS: Performed by: SURGERY

## 2018-08-06 PROCEDURE — 2500000003 HC RX 250 WO HCPCS: Performed by: ANESTHESIOLOGY

## 2018-08-06 PROCEDURE — C1726 CATH, BAL DIL, NON-VASCULAR: HCPCS | Performed by: SURGERY

## 2018-08-06 PROCEDURE — 3600000014 HC SURGERY LEVEL 4 ADDTL 15MIN: Performed by: SURGERY

## 2018-08-06 PROCEDURE — 7100000011 HC PHASE II RECOVERY - ADDTL 15 MIN: Performed by: SURGERY

## 2018-08-06 PROCEDURE — 3700000001 HC ADD 15 MINUTES (ANESTHESIA): Performed by: SURGERY

## 2018-08-06 PROCEDURE — 7100000001 HC PACU RECOVERY - ADDTL 15 MIN: Performed by: SURGERY

## 2018-08-06 PROCEDURE — 47563 LAPARO CHOLECYSTECTOMY/GRAPH: CPT | Performed by: SURGERY

## 2018-08-06 PROCEDURE — 80076 HEPATIC FUNCTION PANEL: CPT

## 2018-08-06 PROCEDURE — 2709999900 HC NON-CHARGEABLE SUPPLY: Performed by: SURGERY

## 2018-08-06 PROCEDURE — 2580000003 HC RX 258: Performed by: ANESTHESIOLOGY

## 2018-08-06 PROCEDURE — 7100000010 HC PHASE II RECOVERY - FIRST 15 MIN: Performed by: SURGERY

## 2018-08-06 PROCEDURE — 2500000003 HC RX 250 WO HCPCS: Performed by: NURSE ANESTHETIST, CERTIFIED REGISTERED

## 2018-08-06 PROCEDURE — 36415 COLL VENOUS BLD VENIPUNCTURE: CPT

## 2018-08-06 PROCEDURE — 7100000000 HC PACU RECOVERY - FIRST 15 MIN: Performed by: SURGERY

## 2018-08-06 PROCEDURE — 6360000004 HC RX CONTRAST MEDICATION: Performed by: SURGERY

## 2018-08-06 PROCEDURE — 6360000002 HC RX W HCPCS: Performed by: NURSE ANESTHETIST, CERTIFIED REGISTERED

## 2018-08-06 PROCEDURE — 3700000000 HC ANESTHESIA ATTENDED CARE: Performed by: SURGERY

## 2018-08-06 PROCEDURE — 74300 X-RAY BILE DUCTS/PANCREAS: CPT

## 2018-08-06 PROCEDURE — 2580000003 HC RX 258: Performed by: SURGERY

## 2018-08-06 RX ORDER — HYDROCODONE BITARTRATE AND ACETAMINOPHEN 5; 325 MG/1; MG/1
1 TABLET ORAL EVERY 6 HOURS PRN
Qty: 28 TABLET | Refills: 0 | Status: SHIPPED | OUTPATIENT
Start: 2018-08-06 | End: 2018-08-13

## 2018-08-06 RX ORDER — PROMETHAZINE HYDROCHLORIDE 25 MG/ML
6.25 INJECTION, SOLUTION INTRAMUSCULAR; INTRAVENOUS
Status: DISCONTINUED | OUTPATIENT
Start: 2018-08-06 | End: 2018-08-06 | Stop reason: HOSPADM

## 2018-08-06 RX ORDER — ONDANSETRON 2 MG/ML
4 INJECTION INTRAMUSCULAR; INTRAVENOUS PRN
Status: DISCONTINUED | OUTPATIENT
Start: 2018-08-06 | End: 2018-08-06 | Stop reason: HOSPADM

## 2018-08-06 RX ORDER — ROCURONIUM BROMIDE 10 MG/ML
INJECTION, SOLUTION INTRAVENOUS PRN
Status: DISCONTINUED | OUTPATIENT
Start: 2018-08-06 | End: 2018-08-06 | Stop reason: SDUPTHER

## 2018-08-06 RX ORDER — HYDROMORPHONE HCL 110MG/55ML
0.5 PATIENT CONTROLLED ANALGESIA SYRINGE INTRAVENOUS EVERY 5 MIN PRN
Status: DISCONTINUED | OUTPATIENT
Start: 2018-08-06 | End: 2018-08-06 | Stop reason: HOSPADM

## 2018-08-06 RX ORDER — HEPARIN SODIUM 5000 [USP'U]/ML
INJECTION, SOLUTION INTRAVENOUS; SUBCUTANEOUS
Status: DISCONTINUED
Start: 2018-08-06 | End: 2018-08-06 | Stop reason: HOSPADM

## 2018-08-06 RX ORDER — SODIUM CHLORIDE, SODIUM LACTATE, POTASSIUM CHLORIDE, AND CALCIUM CHLORIDE .6; .31; .03; .02 G/100ML; G/100ML; G/100ML; G/100ML
IRRIGANT IRRIGATION PRN
Status: DISCONTINUED | OUTPATIENT
Start: 2018-08-06 | End: 2018-08-06 | Stop reason: HOSPADM

## 2018-08-06 RX ORDER — OXYCODONE HYDROCHLORIDE AND ACETAMINOPHEN 5; 325 MG/1; MG/1
1 TABLET ORAL PRN
Status: DISCONTINUED | OUTPATIENT
Start: 2018-08-06 | End: 2018-08-06 | Stop reason: HOSPADM

## 2018-08-06 RX ORDER — SODIUM CHLORIDE, SODIUM LACTATE, POTASSIUM CHLORIDE, CALCIUM CHLORIDE 600; 310; 30; 20 MG/100ML; MG/100ML; MG/100ML; MG/100ML
INJECTION, SOLUTION INTRAVENOUS
Status: DISCONTINUED
Start: 2018-08-06 | End: 2018-08-06 | Stop reason: HOSPADM

## 2018-08-06 RX ORDER — MORPHINE SULFATE 10 MG/ML
2 INJECTION, SOLUTION INTRAMUSCULAR; INTRAVENOUS EVERY 5 MIN PRN
Status: DISCONTINUED | OUTPATIENT
Start: 2018-08-06 | End: 2018-08-06 | Stop reason: HOSPADM

## 2018-08-06 RX ORDER — HEPARIN SODIUM 5000 [USP'U]/ML
5000 INJECTION, SOLUTION INTRAVENOUS; SUBCUTANEOUS ONCE
Status: COMPLETED | OUTPATIENT
Start: 2018-08-06 | End: 2018-08-06

## 2018-08-06 RX ORDER — HYDRALAZINE HYDROCHLORIDE 20 MG/ML
5 INJECTION INTRAMUSCULAR; INTRAVENOUS EVERY 10 MIN PRN
Status: DISCONTINUED | OUTPATIENT
Start: 2018-08-06 | End: 2018-08-06 | Stop reason: HOSPADM

## 2018-08-06 RX ORDER — MEPERIDINE HYDROCHLORIDE 25 MG/ML
12.5 INJECTION INTRAMUSCULAR; INTRAVENOUS; SUBCUTANEOUS EVERY 5 MIN PRN
Status: DISCONTINUED | OUTPATIENT
Start: 2018-08-06 | End: 2018-08-06 | Stop reason: HOSPADM

## 2018-08-06 RX ORDER — SODIUM CHLORIDE, SODIUM LACTATE, POTASSIUM CHLORIDE, CALCIUM CHLORIDE 600; 310; 30; 20 MG/100ML; MG/100ML; MG/100ML; MG/100ML
INJECTION, SOLUTION INTRAVENOUS CONTINUOUS
Status: DISCONTINUED | OUTPATIENT
Start: 2018-08-06 | End: 2018-08-06 | Stop reason: HOSPADM

## 2018-08-06 RX ORDER — BUPIVACAINE HYDROCHLORIDE 5 MG/ML
INJECTION, SOLUTION EPIDURAL; INTRACAUDAL PRN
Status: DISCONTINUED | OUTPATIENT
Start: 2018-08-06 | End: 2018-08-06 | Stop reason: HOSPADM

## 2018-08-06 RX ORDER — LABETALOL HYDROCHLORIDE 5 MG/ML
5 INJECTION, SOLUTION INTRAVENOUS EVERY 10 MIN PRN
Status: DISCONTINUED | OUTPATIENT
Start: 2018-08-06 | End: 2018-08-06 | Stop reason: HOSPADM

## 2018-08-06 RX ORDER — FENTANYL CITRATE 50 UG/ML
INJECTION, SOLUTION INTRAMUSCULAR; INTRAVENOUS PRN
Status: DISCONTINUED | OUTPATIENT
Start: 2018-08-06 | End: 2018-08-06 | Stop reason: SDUPTHER

## 2018-08-06 RX ORDER — DIPHENHYDRAMINE HYDROCHLORIDE 50 MG/ML
12.5 INJECTION INTRAMUSCULAR; INTRAVENOUS
Status: DISCONTINUED | OUTPATIENT
Start: 2018-08-06 | End: 2018-08-06 | Stop reason: HOSPADM

## 2018-08-06 RX ORDER — GLYCOPYRROLATE 0.2 MG/ML
INJECTION INTRAMUSCULAR; INTRAVENOUS PRN
Status: DISCONTINUED | OUTPATIENT
Start: 2018-08-06 | End: 2018-08-06 | Stop reason: SDUPTHER

## 2018-08-06 RX ORDER — HYDROMORPHONE HCL 110MG/55ML
0.25 PATIENT CONTROLLED ANALGESIA SYRINGE INTRAVENOUS EVERY 5 MIN PRN
Status: DISCONTINUED | OUTPATIENT
Start: 2018-08-06 | End: 2018-08-06 | Stop reason: HOSPADM

## 2018-08-06 RX ORDER — PROPOFOL 10 MG/ML
INJECTION, EMULSION INTRAVENOUS PRN
Status: DISCONTINUED | OUTPATIENT
Start: 2018-08-06 | End: 2018-08-06 | Stop reason: SDUPTHER

## 2018-08-06 RX ORDER — OXYCODONE HYDROCHLORIDE AND ACETAMINOPHEN 5; 325 MG/1; MG/1
2 TABLET ORAL PRN
Status: DISCONTINUED | OUTPATIENT
Start: 2018-08-06 | End: 2018-08-06 | Stop reason: HOSPADM

## 2018-08-06 RX ORDER — MAGNESIUM HYDROXIDE 1200 MG/15ML
LIQUID ORAL CONTINUOUS PRN
Status: DISCONTINUED | OUTPATIENT
Start: 2018-08-06 | End: 2018-08-06 | Stop reason: HOSPADM

## 2018-08-06 RX ORDER — MORPHINE SULFATE 10 MG/ML
1 INJECTION, SOLUTION INTRAMUSCULAR; INTRAVENOUS EVERY 5 MIN PRN
Status: DISCONTINUED | OUTPATIENT
Start: 2018-08-06 | End: 2018-08-06 | Stop reason: HOSPADM

## 2018-08-06 RX ORDER — SODIUM CHLORIDE 0.9 % (FLUSH) 0.9 %
SYRINGE (ML) INJECTION
Status: DISCONTINUED
Start: 2018-08-06 | End: 2018-08-06 | Stop reason: HOSPADM

## 2018-08-06 RX ADMIN — SODIUM CHLORIDE, POTASSIUM CHLORIDE, SODIUM LACTATE AND CALCIUM CHLORIDE: 600; 310; 30; 20 INJECTION, SOLUTION INTRAVENOUS at 08:45

## 2018-08-06 RX ADMIN — VANCOMYCIN HYDROCHLORIDE 1000 MG: 1 INJECTION, POWDER, LYOPHILIZED, FOR SOLUTION INTRAVENOUS at 09:16

## 2018-08-06 RX ADMIN — HEPARIN SODIUM 5000 UNITS: 5000 INJECTION, SOLUTION INTRAVENOUS; SUBCUTANEOUS at 08:50

## 2018-08-06 RX ADMIN — GLYCOPYRROLATE 0.2 MG: 0.2 INJECTION, SOLUTION INTRAMUSCULAR; INTRAVENOUS at 09:24

## 2018-08-06 RX ADMIN — HYDROMORPHONE HYDROCHLORIDE 0.5 MG: 2 INJECTION, SOLUTION INTRAMUSCULAR; INTRAVENOUS; SUBCUTANEOUS at 10:55

## 2018-08-06 RX ADMIN — PROMETHAZINE HYDROCHLORIDE 6.25 MG: 25 INJECTION INTRAMUSCULAR; INTRAVENOUS at 10:51

## 2018-08-06 RX ADMIN — ROCURONIUM BROMIDE 10 MG: 10 INJECTION, SOLUTION INTRAVENOUS at 09:52

## 2018-08-06 RX ADMIN — PROPOFOL 150 MG: 10 INJECTION, EMULSION INTRAVENOUS at 09:25

## 2018-08-06 RX ADMIN — HYDROMORPHONE HYDROCHLORIDE 0.5 MG: 2 INJECTION, SOLUTION INTRAMUSCULAR; INTRAVENOUS; SUBCUTANEOUS at 11:02

## 2018-08-06 RX ADMIN — FENTANYL CITRATE 100 MCG: 50 INJECTION, SOLUTION INTRAMUSCULAR; INTRAVENOUS at 09:24

## 2018-08-06 RX ADMIN — ROCURONIUM BROMIDE 40 MG: 10 INJECTION, SOLUTION INTRAVENOUS at 09:25

## 2018-08-06 RX ADMIN — SUGAMMADEX 200 MG: 100 INJECTION, SOLUTION INTRAVENOUS at 10:06

## 2018-08-06 RX ADMIN — LIDOCAINE HYDROCHLORIDE 0.1 ML: 10 INJECTION, SOLUTION EPIDURAL; INFILTRATION; INTRACAUDAL; PERINEURAL at 08:45

## 2018-08-06 ASSESSMENT — PULMONARY FUNCTION TESTS
PIF_VALUE: 26
PIF_VALUE: 26
PIF_VALUE: 19
PIF_VALUE: 0
PIF_VALUE: 5
PIF_VALUE: 23
PIF_VALUE: 25
PIF_VALUE: 20
PIF_VALUE: 25
PIF_VALUE: 19
PIF_VALUE: 1
PIF_VALUE: 27
PIF_VALUE: 17
PIF_VALUE: 17
PIF_VALUE: 19
PIF_VALUE: 26
PIF_VALUE: 25
PIF_VALUE: 0
PIF_VALUE: 25
PIF_VALUE: 26
PIF_VALUE: 27
PIF_VALUE: 22
PIF_VALUE: 27
PIF_VALUE: 0
PIF_VALUE: 26
PIF_VALUE: 7
PIF_VALUE: 28
PIF_VALUE: 20
PIF_VALUE: 27
PIF_VALUE: 26
PIF_VALUE: 25
PIF_VALUE: 27
PIF_VALUE: 28
PIF_VALUE: 17
PIF_VALUE: 19
PIF_VALUE: 26
PIF_VALUE: 26
PIF_VALUE: 25
PIF_VALUE: 19
PIF_VALUE: 27
PIF_VALUE: 27
PIF_VALUE: 17
PIF_VALUE: 27
PIF_VALUE: 22
PIF_VALUE: 19
PIF_VALUE: 20

## 2018-08-06 ASSESSMENT — PAIN SCALES - GENERAL
PAINLEVEL_OUTOF10: 3
PAINLEVEL_OUTOF10: 1
PAINLEVEL_OUTOF10: 7
PAINLEVEL_OUTOF10: 7

## 2018-08-06 ASSESSMENT — PAIN DESCRIPTION - PAIN TYPE: TYPE: SURGICAL PAIN

## 2018-08-06 ASSESSMENT — PAIN DESCRIPTION - LOCATION: LOCATION: ABDOMEN

## 2018-08-06 ASSESSMENT — PAIN - FUNCTIONAL ASSESSMENT: PAIN_FUNCTIONAL_ASSESSMENT: 0-10

## 2018-08-06 NOTE — ANESTHESIA POSTPROCEDURE EVALUATION
Department of Anesthesiology  Postprocedure Note    Patient: Andres Simpson  MRN: [id number]  YOB: 1945  Date of evaluation: 8/6/2018  Time:  12:09 PM     Procedure Summary     Date:  08/06/18 Room / Location:  [place] [address] / [facility] OR    Anesthesia Start:  2495 Anesthesia Stop:  21     Procedure:  LAPAROSCOPIC CHOLECYSTECTOMY WITH CHOLANGIOGRAMS (N/A Abdomen) Diagnosis:  (GALLSTONE PANCREATITIS)    Surgeon:  Jeremiah Acosta MD Responsible Provider:  Adalid Franks MD    Anesthesia Type:  general ASA Status:  3          Anesthesia Type: general    Sunni Phase I: Sunni Score: 8    Sunni Phase II:      Last vitals: Reviewed and per EMR flowsheets.        Anesthesia Post Evaluation    Patient location during evaluation: PACU  Patient participation: complete - patient participated  Level of consciousness: awake and alert  Pain score: 0  Airway patency: patent  Nausea & Vomiting: no nausea and no vomiting  Complications: no  Cardiovascular status: blood pressure returned to baseline  Respiratory status: acceptable  Hydration status: stable

## 2018-08-06 NOTE — H&P
[facility]      The H&P was reviewed, the patient was examined, and no change has occurred in the patient's condition since the H&P was completed. The indications for the procedure were reviewed, and any questions were answered.     I updated the consult note from 7/31/2018 which is the H&P.

## 2018-08-06 NOTE — PROGRESS NOTES
NO CHANGE IN PHYSICAL ASSESSMENT; PT AND FAMILY VERBALIZE UNDERSTANDING OF INSTRUCTIONS; PT DISCHARGED VIA W/C BY TRANSPORT WITH FAMILY IN STABLE CONDITION; PT STATES PAIN IS 1/10 AND TOLERABLE

## 2018-08-06 NOTE — BRIEF OP NOTE
Brief Postoperative Note  ______________________________________________________________    Patient: Sloane Peterson  YOB: 1945  MRN: [id number]  Date of Procedure: 8/6/2018    Pre-Op Diagnosis: GALLSTONE PANCREATITIS    Post-Op Diagnosis: Same       Procedure(s):  LAPAROSCOPIC CHOLECYSTECTOMY WITH CHOLANGIOGRAMS    Anesthesia: General    Surgeon(s):  Pascual Han MD    Staff:  Surgical Assistant: Iris Louie  Scrub Person First: Regi Crow     Estimated Blood Loss: * No values recorded between 8/6/2018  9:22 AM and 8/6/2018 10:10 AM * Less than 50    Complications: None    Specimens:   ID Type Source Tests Collected by Time Destination   A : gallbladder and contents Tissue Gallbladder SURGICAL PATHOLOGY Pascual Han MD 8/6/2018 5500        Implants:  * No implants in log *      Drains:      Findings: As above    Pantera Medrano MD  Date: 8/6/2018  Time: 10:10 AM

## 2018-08-06 NOTE — ANESTHESIA PRE PROCEDURE
145 lb (65.8 kg)   Height:  5' 5\" (1.651 m)                                              BP Readings from Last 3 Encounters:   08/06/18 135/68   08/01/18 136/61       NPO Status: Time of last liquid consumption: 2000                        Time of last solid consumption: 1700                        Date of last liquid consumption: 08/05/18                        Date of last solid food consumption: 08/05/18    BMI:   Wt Readings from Last 3 Encounters:   08/06/18 145 lb (65.8 kg)   07/30/18 148 lb 1 oz (67.2 kg)     Body mass index is 24.13 kg/m². CBC:   Lab Results   Component Value Date    WBC 8.7 08/01/2018    RBC 4.05 08/01/2018    HGB 12.4 08/01/2018    HCT 36.7 08/01/2018    MCV 90.6 08/01/2018    RDW 14.2 08/01/2018     08/01/2018       CMP:   Lab Results   Component Value Date     08/01/2018    K 4.2 08/01/2018    K 4.0 07/31/2018     08/01/2018    CO2 21 08/01/2018    BUN 9 08/01/2018    CREATININE 0.6 08/01/2018    GFRAA >60 08/01/2018    AGRATIO 1.4 07/30/2018    LABGLOM >60 08/01/2018    GLUCOSE 74 08/01/2018    PROT 6.2 08/01/2018    CALCIUM 8.8 08/01/2018    BILITOT 1.3 08/01/2018    ALKPHOS 105 08/01/2018    AST 64 08/01/2018    ALT 96 08/01/2018       POC Tests: No results for input(s): POCGLU, POCNA, POCK, POCCL, POCBUN, POCHEMO, POCHCT in the last 72 hours.     Coags: No results found for: PROTIME, INR, APTT    HCG (If Applicable): No results found for: PREGTESTUR, PREGSERUM, HCG, HCGQUANT     ABGs: No results found for: PHART, PO2ART, RBJ0CPG, LZI6OMN, BEART, C9SWKLIB     Type & Screen (If Applicable):  No results found for: [facility]    Anesthesia Evaluation  Patient summary reviewed and Nursing notes reviewed  Airway: Mallampati: II  TM distance: <3 FB   Neck ROM: full  Mouth opening: < 3 FB Dental: normal exam         Pulmonary:Negative Pulmonary ROS and normal exam  breath sounds clear to auscultation                             Cardiovascular:    (+) pacemaker:

## 2018-08-07 ENCOUNTER — TELEPHONE (OUTPATIENT)
Dept: SURGERY | Age: 73
End: 2018-08-07

## 2018-08-07 NOTE — OP NOTE
[address]                  [address] [place] [address]                                 OPERATIVE REPORT    PATIENT NAME: Yusra Morocho                     :        1945  MED REC NO:   [id number]                          ROOM:  ACCOUNT NO:   [de-identified]                           ADMIT DATE: 2018  PROVIDER:     Suleiman Hector MD    DATE OF PROCEDURE:  2018    LOCATION:  [facility]. OPERATION PERFORMED:  Laparoscopic cholecystectomy with intraoperative  cholangiogram.    PREOPERATIVE DIAGNOSIS:  Gallstone pancreatitis. POSTOPERATIVE DIAGNOSIS:  Gallstone pancreatitis. SURGEON:  Suleiman Hector MD    ANESTHESIA:  General.    COMPLICATIONS:  None. INDICATIONS FOR THE OPERATION:  This is a 50-year-old female, recently  hospitalized with gallstone pancreatitis, _____ clinically resolved. Her  liver enzymes normalized. She had imaging, showed gallstones. The risks  and benefits of operative intervention were explained. The patient  understood them, accepted them and elected to proceed. DESCRIPTION OF THE OPERATION:  The patient was brought to the operating  room. General anesthesia was induced. She was prepped and draped in usual  surgical sterile fashion. A vertical supraumbilical incision was made with  a knife. Subcutaneous tissues were spread. Penetrating towel clip was  used to elevate the anterior abdominal wall. The Veress needle was  inserted. Pneumoperitoneum was established. Disposable 5-mm trocar was  passed through the incision. Laparoscope was inserted. Under direct  vision, 11-mm port was placed in the epigastrium and two 5-mm ports in the  right upper quadrant. We had adequate visualization and retraction. I  identified the cystic duct as it tapered into the gallbladder. A clip was  placed next to the gallbladder. A nick was made in the cystic duct.    Cholangiogram catheter was brought across the right subcostal abdominal  wall. Cholangiogram was obtained. This confirmed our position in the  cystic duct. I visualized the common hepatic duct, common bile duct and  both biliary radicles. There was prompt flow of contrast into the  duodenum. There were no obvious filling defects. I removed the  cholangiogram catheter. A PDS Endoloop was used to control the cystic duct  stump. Cystic artery had 2 clips placed proximal and one clip distal and  it was divided. Posterior branch of the artery was clipped as well. Gallbladder was dissected from the gallbladder fossa, liver bed. Gallbladder was brought out through the epigastric incision. I reinspected  the right upper quadrant. I copiously irrigated the area. I suctioned out  the irrigant. There was no evident bleeding, bile leak or complication. I  deflated the abdomen and removed the trocars. The fascia at the epigastric  port site was reapproximated with 0 Vicryl suture. Local anesthetic was  infiltrated. A 4-0 Vicryl was used to reapproximate the skin at all the  incisions. Benzoin and Steri-Strip dressing were placed. DISPOSITION:  The patient tolerated the procedure without any acute  complication.         Lurline Goldberg, MD    D: 08/06/2018 22:15:20       T: 08/06/2018 22:16:58     [email or web address]  Job#: [id number]     Doc#: [id number]    CC:  MD Margarito Contreras MD

## 2018-08-20 ENCOUNTER — OFFICE VISIT (OUTPATIENT)
Dept: SURGERY | Age: 73
End: 2018-08-20

## 2018-08-20 VITALS
HEIGHT: 65 IN | SYSTOLIC BLOOD PRESSURE: 134 MMHG | DIASTOLIC BLOOD PRESSURE: 80 MMHG | WEIGHT: 142 LBS | BODY MASS INDEX: 23.66 KG/M2

## 2018-08-20 DIAGNOSIS — Z09 SURGERY FOLLOW-UP EXAMINATION: Primary | ICD-10-CM

## 2018-08-20 PROCEDURE — 99024 POSTOP FOLLOW-UP VISIT: CPT | Performed by: SURGERY

## 2018-08-20 RX ORDER — OMEPRAZOLE 20 MG/1
20 CAPSULE, DELAYED RELEASE ORAL
COMMUNITY
Start: 2017-08-14

## 2018-08-20 RX ORDER — PRAVASTATIN SODIUM 40 MG
40 TABLET ORAL
COMMUNITY
Start: 2018-02-12 | End: 2018-08-20

## 2018-08-20 RX ORDER — MULTIVIT-MIN/IRON/FOLIC ACID/K 18-600-40
2000 CAPSULE ORAL
COMMUNITY
Start: 2017-08-14

## 2018-08-20 RX ORDER — LANOLIN ALCOHOL/MO/W.PET/CERES
CREAM (GRAM) TOPICAL
COMMUNITY

## (undated) DEVICE — KIT EVAC 100CC W10MMXL20CM SIL FULL PERF HUBLESS FLAT DRN

## (undated) DEVICE — APPLIER CLP M L L11.4IN DIA10MM ENDOSCP ROT MULT FOR LIG

## (undated) DEVICE — MAJOR SET UP PK

## (undated) DEVICE — 3M™ STERI-STRIP™ COMPOUND BENZOIN TINCTURE 40 BAGS/CARTON 4 CARTONS/CASE C1544: Brand: 3M™ STERI-STRIP™

## (undated) DEVICE — DRAPE C ARM UNIV W41XL74IN CLR PLAS XR VELC CLSR POLY STRP

## (undated) DEVICE — CATHETER IV 14GA L1.25IN PTFE ORNG FEP SFTY STR HUB RADPQ

## (undated) DEVICE — YANKAUER,BULB TIP,W/O VENT,RIGID,STERILE: Brand: MEDLINE

## (undated) DEVICE — CATHETER CHOLGM 4.5FR L18IN W/ MTL SUPP TB

## (undated) DEVICE — CO2 INSUFFLATION NEEDLE: Brand: CORE DYNAMICS

## (undated) DEVICE — TUBING, SUCTION, 3/16" X 10', STRAIGHT: Brand: MEDLINE

## (undated) DEVICE — LOOP LIG SUT SZ 0 L18IN ABSRB POLYDIOXANONE MFIL PDS II

## (undated) DEVICE — GLOVE ORANGE PI 8 1/2   MSG9085

## (undated) DEVICE — CORD ES L10FT MPLR LAP

## (undated) DEVICE — SYRINGE MED 10ML LUERLOCK TIP W/O SFTY DISP

## (undated) DEVICE — SOLUTION IV IRRIG 500ML 0.9% SODIUM CHL 2F7123

## (undated) DEVICE — PUMP SUC IRR TBNG L10FT W/ HNDPC ASSEMB STRYKEFLOW 2

## (undated) DEVICE — STANDARD HYPODERMIC NEEDLE,POLYPROPYLENE HUB: Brand: MONOJECT

## (undated) DEVICE — 3M™ TEGADERM™ TRANSPARENT FILM DRESSING FRAME STYLE, 1624W, 2-3/8 IN X 2-3/4 IN (6 CM X 7 CM), 100/CT 4CT/CASE: Brand: 3M™ TEGADERM™

## (undated) DEVICE — KIT,ANTI FOG,W/SPONGE & FLUID,SOFT PACK: Brand: MEDLINE

## (undated) DEVICE — SUTURE VCRL SZ 4-0 L18IN ABSRB UD L19MM PS-2 3/8 CIR PRIM J496H

## (undated) DEVICE — TUBING INSUF ISO CONN DISP

## (undated) DEVICE — ELECTRODE PT RET AD L9FT HI MOIST COND ADH HYDRGEL CORDED

## (undated) DEVICE — Device: Brand: MEDEX

## (undated) DEVICE — SUTURE SZ 0 27IN 5/8 CIR UR-6  TAPER PT VIOLET ABSRB VICRYL J603H

## (undated) DEVICE — NEEDLE INSUF L120MM DIA2MM DISP FOR PNEUMOPERI ENDOPATH

## (undated) DEVICE — APPLICATOR PREP 26ML 0.7% IOD POVACRYLEX 74% ISO ALC ST

## (undated) DEVICE — TROCAR ENDOSCP L100MM DIA5MM BLDELSS STBL SL OBT RADLUC

## (undated) DEVICE — GOWN,AURORA,NONREINF,RAGLAN,XXL,STERILE: Brand: MEDLINE

## (undated) DEVICE — GAUZE SPONGES,8 PLY: Brand: CURITY

## (undated) DEVICE — DRAPE,ABDOMINAL,MAJOR,STERILE: Brand: MEDLINE

## (undated) DEVICE — INTENDED FOR TISSUE SEPARATION, AND OTHER PROCEDURES THAT REQUIRE A SHARP SURGICAL BLADE TO PUNCTURE OR CUT.: Brand: BARD-PARKER ® STAINLESS STEEL BLADES

## (undated) DEVICE — CIRCUIT ANES L72IN 3L BACT AND VIR FLTR EL CONN SGL LIMB